# Patient Record
Sex: MALE
[De-identification: names, ages, dates, MRNs, and addresses within clinical notes are randomized per-mention and may not be internally consistent; named-entity substitution may affect disease eponyms.]

---

## 2017-08-08 ENCOUNTER — HOSPITAL (OUTPATIENT)
Dept: OTHER | Age: 22
End: 2017-08-08
Attending: EMERGENCY MEDICINE

## 2017-08-08 LAB
ANALYZER ANC (IANC): NORMAL
ANION GAP SERPL CALC-SCNC: 16 MMOL/L (ref 10–20)
BASOPHILS # BLD: 0 THOUSAND/MCL (ref 0–0.3)
BASOPHILS NFR BLD: 0 %
BUN SERPL-MCNC: 16 MG/DL (ref 6–20)
BUN/CREAT SERPL: 20 (ref 7–25)
CALCIUM SERPL-MCNC: 9.2 MG/DL (ref 8.4–10.2)
CHLORIDE: 107 MMOL/L (ref 98–107)
CO2 SERPL-SCNC: 26 MMOL/L (ref 21–32)
CREAT SERPL-MCNC: 0.82 MG/DL (ref 0.67–1.17)
DIFFERENTIAL METHOD BLD: NORMAL
EOSINOPHIL # BLD: 0.2 THOUSAND/MCL (ref 0.1–0.5)
EOSINOPHIL NFR BLD: 2 %
ERYTHROCYTE [DISTWIDTH] IN BLOOD: 12.6 % (ref 11–15)
GLUCOSE SERPL-MCNC: 110 MG/DL (ref 65–99)
HEMATOCRIT: 44.5 % (ref 39–51)
HGB BLD-MCNC: 15.5 GM/DL (ref 13–17)
LYMPHOCYTES # BLD: 2.4 THOUSAND/MCL (ref 1.2–5.2)
LYMPHOCYTES NFR BLD: 30 %
MCH RBC QN AUTO: 31.3 PG (ref 26–34)
MCHC RBC AUTO-ENTMCNC: 34.8 GM/DL (ref 32–36.5)
MCV RBC AUTO: 89.9 FL (ref 78–100)
MONOCYTES # BLD: 0.5 THOUSAND/MCL (ref 0.3–0.9)
MONOCYTES NFR BLD: 7 %
NEUTROPHILS # BLD: 4.9 THOUSAND/MCL (ref 1.8–8)
NEUTROPHILS NFR BLD: 61 %
NEUTS SEG NFR BLD: NORMAL %
PERCENT NRBC: NORMAL
PLATELET # BLD: 278 THOUSAND/MCL (ref 140–450)
POTASSIUM SERPL-SCNC: 3.5 MMOL/L (ref 3.4–5.1)
RBC # BLD: 4.95 MILLION/MCL (ref 4.5–5.9)
SODIUM SERPL-SCNC: 145 MMOL/L (ref 135–145)
WBC # BLD: 7.9 THOUSAND/MCL (ref 4.2–11)

## 2017-09-04 ENCOUNTER — CHARTING TRANS (OUTPATIENT)
Dept: OTHER | Age: 22
End: 2017-09-04

## 2017-09-05 ENCOUNTER — CHARTING TRANS (OUTPATIENT)
Dept: OTHER | Age: 22
End: 2017-09-05

## 2022-04-30 ENCOUNTER — HOSPITAL ENCOUNTER (OUTPATIENT)
Age: 27
Discharge: HOME | End: 2022-04-30
Payer: COMMERCIAL

## 2022-04-30 ENCOUNTER — APPOINTMENT (OUTPATIENT)
Dept: GENERAL RADIOLOGY | Age: 27
End: 2022-04-30
Attending: NURSE PRACTITIONER
Payer: COMMERCIAL

## 2022-04-30 VITALS
DIASTOLIC BLOOD PRESSURE: 90 MMHG | OXYGEN SATURATION: 98 % | HEART RATE: 90 BPM | SYSTOLIC BLOOD PRESSURE: 137 MMHG | RESPIRATION RATE: 16 BRPM | TEMPERATURE: 98 F

## 2022-04-30 DIAGNOSIS — S69.91XA INJURY OF RIGHT WRIST, INITIAL ENCOUNTER: Primary | ICD-10-CM

## 2022-04-30 PROCEDURE — L3924 HFO WITHOUT JOINTS PRE OTS: HCPCS | Performed by: NURSE PRACTITIONER

## 2022-04-30 PROCEDURE — 73110 X-RAY EXAM OF WRIST: CPT | Performed by: NURSE PRACTITIONER

## 2022-04-30 PROCEDURE — 99203 OFFICE O/P NEW LOW 30 MIN: CPT | Performed by: NURSE PRACTITIONER

## 2022-04-30 NOTE — ED INITIAL ASSESSMENT (HPI)
Pt comes in for eval today of R wrist injury. Reports that he had a few drinks last night and thinks that he tripped on stairs last night. Reports waking up and having severe R wrist pain with limited ROM b/c of pain and difficulty gripping. Denies numbness & tingling, neuro & circulation intact distally. Denies any other injuries.

## 2022-05-23 ENCOUNTER — OFFICE VISIT (OUTPATIENT)
Dept: ORTHOPEDICS CLINIC | Facility: CLINIC | Age: 27
End: 2022-05-23
Payer: COMMERCIAL

## 2022-05-23 ENCOUNTER — HOSPITAL ENCOUNTER (OUTPATIENT)
Dept: GENERAL RADIOLOGY | Facility: HOSPITAL | Age: 27
Discharge: HOME | End: 2022-05-23
Attending: ORTHOPAEDIC SURGERY
Payer: COMMERCIAL

## 2022-05-23 VITALS — HEIGHT: 66 IN | WEIGHT: 180 LBS | BODY MASS INDEX: 28.93 KG/M2

## 2022-05-23 DIAGNOSIS — R52 PAIN: Primary | ICD-10-CM

## 2022-05-23 DIAGNOSIS — R52 PAIN: ICD-10-CM

## 2022-05-23 DIAGNOSIS — S63.501A SPRAIN OF RIGHT WRIST, INITIAL ENCOUNTER: ICD-10-CM

## 2022-05-23 PROCEDURE — 73110 X-RAY EXAM OF WRIST: CPT | Performed by: ORTHOPAEDIC SURGERY

## 2025-07-18 ENCOUNTER — APPOINTMENT (OUTPATIENT)
Dept: CT IMAGING | Facility: HOSPITAL | Age: 30
End: 2025-07-18
Attending: NURSE PRACTITIONER
Payer: COMMERCIAL

## 2025-07-18 ENCOUNTER — HOSPITAL ENCOUNTER (OUTPATIENT)
Age: 30
Discharge: HOME | End: 2025-07-18
Payer: COMMERCIAL

## 2025-07-18 VITALS
HEART RATE: 80 BPM | DIASTOLIC BLOOD PRESSURE: 86 MMHG | RESPIRATION RATE: 18 BRPM | TEMPERATURE: 99 F | OXYGEN SATURATION: 100 % | SYSTOLIC BLOOD PRESSURE: 144 MMHG

## 2025-07-18 DIAGNOSIS — R10.32 LLQ PAIN: ICD-10-CM

## 2025-07-18 DIAGNOSIS — K57.92 ACUTE DIVERTICULITIS: Primary | ICD-10-CM

## 2025-07-18 DIAGNOSIS — N32.89 BLADDER WALL THICKENING: ICD-10-CM

## 2025-07-18 LAB
#MXD IC: 1 X10ˆ3/UL (ref 0.1–1)
BILIRUB UR QL STRIP: NEGATIVE
BUN BLD-MCNC: 19 MG/DL (ref 7–18)
CHLORIDE BLD-SCNC: 106 MMOL/L (ref 98–112)
CLARITY UR: CLEAR
CO2 BLD-SCNC: 23 MMOL/L (ref 21–32)
CREAT BLD-MCNC: 0.7 MG/DL (ref 0.7–1.3)
EGFRCR SERPLBLD CKD-EPI 2021: 129 ML/MIN/1.73M2 (ref 60–?)
GLUCOSE BLD-MCNC: 97 MG/DL (ref 70–99)
GLUCOSE UR STRIP-MCNC: NEGATIVE MG/DL
HCT VFR BLD AUTO: 46.9 % (ref 39–53)
HCT VFR BLD CALC: 50 % (ref 37–53)
HGB BLD-MCNC: 15.5 G/DL (ref 13–17.5)
HGB UR QL STRIP: NEGATIVE
ISTAT IONIZED CALCIUM FOR CHEM 8: 1.21 MMOL/L (ref 1.12–1.32)
KETONES UR STRIP-MCNC: NEGATIVE MG/DL
LEUKOCYTE ESTERASE UR QL STRIP: NEGATIVE
LYMPHOCYTES # BLD AUTO: 1.3 X10ˆ3/UL (ref 1–4)
LYMPHOCYTES NFR BLD AUTO: 10.6 %
MCH RBC QN AUTO: 29.4 PG (ref 26–34)
MCHC RBC AUTO-ENTMCNC: 33 G/DL (ref 31–37)
MCV RBC AUTO: 88.8 FL (ref 80–100)
MIXED CELL %: 8.2 %
NEUTROPHILS # BLD AUTO: 9.9 X10ˆ3/UL (ref 1.5–7.7)
NEUTROPHILS NFR BLD AUTO: 81.2 %
NITRITE UR QL STRIP: NEGATIVE
PH UR STRIP: 6.5 [PH]
PLATELET # BLD AUTO: 296 X10ˆ3/UL (ref 150–450)
POTASSIUM BLD-SCNC: 4 MMOL/L (ref 3.6–5.1)
RBC # BLD AUTO: 5.28 X10ˆ6/UL (ref 4.3–5.7)
SODIUM BLD-SCNC: 142 MMOL/L (ref 136–145)
SP GR UR STRIP: 1.02
UROBILINOGEN UR STRIP-ACNC: <2 MG/DL
WBC # BLD AUTO: 12.2 X10ˆ3/UL (ref 4–11)

## 2025-07-18 PROCEDURE — 81002 URINALYSIS NONAUTO W/O SCOPE: CPT | Performed by: NURSE PRACTITIONER

## 2025-07-18 PROCEDURE — 74177 CT ABD & PELVIS W/CONTRAST: CPT | Performed by: NURSE PRACTITIONER

## 2025-07-18 PROCEDURE — 85025 COMPLETE CBC W/AUTO DIFF WBC: CPT | Performed by: NURSE PRACTITIONER

## 2025-07-18 PROCEDURE — 80047 BASIC METABLC PNL IONIZED CA: CPT | Performed by: NURSE PRACTITIONER

## 2025-07-18 PROCEDURE — 96372 THER/PROPH/DIAG INJ SC/IM: CPT | Performed by: NURSE PRACTITIONER

## 2025-07-18 PROCEDURE — 99204 OFFICE O/P NEW MOD 45 MIN: CPT | Performed by: NURSE PRACTITIONER

## 2025-07-18 RX ORDER — KETOROLAC TROMETHAMINE 30 MG/ML
15 INJECTION, SOLUTION INTRAMUSCULAR; INTRAVENOUS ONCE
Status: COMPLETED | OUTPATIENT
Start: 2025-07-18 | End: 2025-07-18

## 2025-07-18 NOTE — ED PROVIDER NOTES
Patient Seen in: [facility]        History  Chief Complaint   Patient presents with    Diverticulitis     Stated Complaint: SKin Problem    Subjective:   HPI    29-year-old male here for evaluation of lower abdominal pain, bloating that started yesterday.  He took Metamucil yesterday, Aleve for pain with some relief.  His stools have been abnormal, having frequent stools which are hard.  He denies blood or mucus in his stools, nausea or vomiting, fever or chills, back pain, urinary symptoms, testicular pain or swelling.  First and last flareup of diverticulitis was in October 2024.  He did not see GI after this.    Objective:     Past Medical History:    Diverticulosis              History reviewed. No pertinent surgical history.             Social History     Socioeconomic History    Marital status: Single   Tobacco Use    Smoking status: Former     Passive exposure: Never   Vaping Use    Vaping status: Every Day   Substance and Sexual Activity    Alcohol use: Yes    Drug use: Yes     Types: Cannabis              Review of Systems    Positive for stated complaint: SKin Problem  Other systems are as noted in HPI.  Constitutional and vital signs reviewed.      All other systems reviewed and negative except as noted above.                  Physical Exam    ED Triage Vitals [07/18/25 0809]   /86   Pulse 80   Resp 18   Temp 98.9 °F (37.2 °C)   Temp src Oral   SpO2 100 %   O2 Device None (Room air)       Current Vitals:   Vital Signs  BP: 144/86  Pulse: 80  Resp: 18  Temp: 98.9 °F (37.2 °C)  Temp src: Oral    Oxygen Therapy  SpO2: 100 %  O2 Device: None (Room air)            Physical Exam  Vitals and nursing note reviewed.   Constitutional:       General: He is not in acute distress.     Appearance: Normal appearance. He is not ill-appearing, toxic-appearing or diaphoretic.   HENT:      Mouth/Throat:      Mouth: Mucous membranes are moist.      Pharynx: No posterior oropharyngeal erythema.   Eyes:       Pupils: Pupils are equal, round, and reactive to light.   Cardiovascular:      Rate and Rhythm: Normal rate.      Pulses: Normal pulses.   Pulmonary:      Effort: Pulmonary effort is normal. No respiratory distress.   Abdominal:      General: Abdomen is flat. Bowel sounds are normal. There is no distension.      Palpations: Abdomen is soft.      Tenderness: There is abdominal tenderness in the right lower quadrant, suprapubic area and left lower quadrant. There is guarding and rebound. There is no right CVA tenderness or left CVA tenderness. Negative signs include Ozuna's sign and McBurney's sign.   Skin:     General: Skin is warm.      Findings: No rash.   Neurological:      Mental Status: He is alert and oriented to person, place, and time.   Psychiatric:         Mood and Affect: Mood normal.         Behavior: Behavior normal.                 ED Course  Labs Reviewed   POCT CBC - Abnormal; Notable for the following components:       Result Value    WBC IC 12.2 (*)     # Neutrophil 9.9 (*)     All other components within normal limits   [facility] POCT URINALYSIS DIPSTICK - Abnormal; Notable for the following components:    Protein urine Trace (*)     All other components within normal limits   POCT ISTAT CHEM8 CARTRIDGE - Abnormal; Notable for the following components:    ISTAT BUN 19 (*)     All other components within normal limits   URINE CULTURE, ROUTINE       ED Course as of 07/18/25 1032  ------------------------------------------------------------  Time: 07/18 0911  Value: WBC IC(!): 12.2  Comment: (Reviewed)  ------------------------------------------------------------  Time: 07/18 0911  Value: # Neutrophil(!): 9.9  Comment: (Reviewed)  ------------------------------------------------------------  Time: 07/18 0911  Value: Protein urine(!): Trace  Comment: (Reviewed)  ------------------------------------------------------------  Time: 07/18 0911  Value: ISTAT BUN(!): 19  Comment:  (Reviewed)  ------------------------------------------------------------  Time: 07/18 1019  Value: CT ABDOMEN+PELVIS(CONTRAST ONLY)(CPT=74177)  Comment:   1.  Acute diverticulitis involving the sigmoid colon. Small volume free fluid in the sigmoid mesentery and dependently in the pelvis is likely reactive. No free intraperitoneal air or well-defined/drainable intra-abdominal collection.   2.  Circumferential urinary bladder wall thickening, which may relate to incomplete distention or cystitis. If there are referable symptoms, urinalysis correlation is requested.   3.  Right L5-S1 spondylolysis, but with no resultant spondylolisthesis.   4.  Lesser incidental findings as above.                            MDM    29-year-old male here for evaluation of lower abdominal pain, bloating that started yesterday.  History of diverticulitis and feels the same    On exam patient well-appearing, breathing easy in no respiratory distress soft nondistended abdomen.  Noted guarding especially to left lower quadrant on exam, tenderness to lower abdomen especially mid to left abdomen.  Reports rebound tenderness when palpating right side.  Skin warm and dry no fever.    Differential diagnoses reflecting the complexity of care include but are not limited to: Diverticulitis, perforated bowel, appendicitis, constipation  Comorbidities that add complexity to management include: history of diverticulosis and diverticulitis  History obtained by an independent source was from: Patient  My independent interpretations of studies include:   CBC = Leukocytosis 12.2, Neutrophils elevated a bit  Chemistry = BUN 19, otherwise unremarkable  Urinalysis = neg    Shared decision making was done by: Patient and myself  Discussions of management was done with: Dr. Limon attending at [place]    Patient is well appearing, non-toxic and in no acute distress.  Vital signs are stable.   Discussed results and plan.  Stable for CT at [facility], driving  himself.  @0914= CT notified    @1025 = spoke with patient about CT results.  Patient does have uncomplicated diverticulitis seen on CT as well as bladder wall thickening.  Urine culture was sent  Augmentin x 7 days sent to pharmacy on file.  Discussed with patient primary treatment for diverticulitis is bowel rest, liquid diet for the next week and then increasing low fiber until feeling better.  Discussed close follow-up with primary care provider, one given as he does not have one and also GI specialist for reevaluation and diverticulitis care.    Patient agrees to plan of care he is stable nontoxic for discharge home  All questions answered. Return and ER precautions given.    Counseled: Patient (and guardian if applicable), regarding diagnosis, regarding treatment plan, regarding diagnostic results, regarding prescription, I have discussed with the patient the results of tests, differential diagnosis, and warning signs and symptoms that should prompt immediate return or ER visit. The patient understands these instructions and agrees to the follow-up plan provided. There is no barriers to learning. Appropriate f/u given. Patient agrees to seek medical attention for any concerns/problems/complications.    Medical Decision Making      Disposition and Plan     Clinical Impression:  1. Acute diverticulitis    2. LLQ pain    3. Bladder wall thickening         Disposition:  Discharge  7/18/2025 10:26 am    Follow-up:  Christiano Garber MD  [address]  [facility] [id number] [phone]    Schedule an appointment as soon as possible for a visit       Jeanne Hernandez E, APRN  [address]  [facility] [address]  [phone]    Schedule an appointment as soon as possible for a visit             Medications Prescribed:  Discharge Medication List as of 7/18/2025 10:31 AM        START taking these medications    Details   amoxicillin clavulanate 875-125 MG Oral Tab Take 1 tablet by mouth 2 (two) times  daily for 7 days., Normal, Disp-14 tablet, R-0                   Supplementary Documentation:

## 2025-07-18 NOTE — DISCHARGE INSTRUCTIONS
Follow-up with primary care provider given if you do not have 1 to follow-up with And GI specialist given.  Diverticulitis are flares that can occur when you have diverticulosis.  This requires close continued care and follow-up.    I would call and make an appointment with both primary and GI specialist for the next 1 to 2 weeks.    Take antibiotic as prescribed, finish full course.    Stick with liquid diet for the next few days, as diverticulitis requires bowel rest.  When feeling better make sure you follow the diet recommended for diverticulitis.    Take Tylenol or ibuprofen every 6 hours for pain as needed  Rest, avoid any heavy lifting, strenuous running or activities until feeling better.    If you have any worsening abdominal pain, blood in your stools, fevers, dizziness, chest pain or shortness of breath please go to the ER for reevaluation      During the acute illness, rest and follow your healthcare provider's instructions about diet. Sometimes you will need to be on a clear liquid diet to rest your bowel. Once your symptoms are better, you may be told to eat a low-fiber diet for some time. You can eat foods such as:  Flake cereal  Mashed potatoes  Pancakes and waffles  Pasta  White bread  Rice  Applesauce  Bananas  Eggs  Fish  Poultry  Tofu  Cooked soft vegetables

## 2025-07-18 NOTE — ED INITIAL ASSESSMENT (HPI)
Patient comes in states that 9 months ago he was dx with diverticulosis and starting yesterday he started to experience the bloating and abd pain that seems like a flare up.